# Patient Record
Sex: MALE | NOT HISPANIC OR LATINO | URBAN - METROPOLITAN AREA
[De-identification: names, ages, dates, MRNs, and addresses within clinical notes are randomized per-mention and may not be internally consistent; named-entity substitution may affect disease eponyms.]

---

## 2019-07-26 ENCOUNTER — IMPORTED ENCOUNTER (OUTPATIENT)
Dept: URBAN - METROPOLITAN AREA CLINIC 1 | Facility: CLINIC | Age: 64
End: 2019-07-26

## 2019-07-26 PROBLEM — H25.812: Noted: 2019-07-26

## 2019-07-26 PROBLEM — H01.004: Noted: 2019-07-26

## 2019-07-26 PROBLEM — H01.001: Noted: 2019-07-26

## 2019-07-26 PROBLEM — H25.813: Noted: 2019-07-26

## 2019-07-26 PROBLEM — H25.811: Noted: 2019-07-26

## 2019-07-26 PROCEDURE — 92015 DETERMINE REFRACTIVE STATE: CPT

## 2019-07-26 PROCEDURE — 92004 COMPRE OPH EXAM NEW PT 1/>: CPT

## 2019-07-26 NOTE — PATIENT DISCUSSION
1.  Cataract OD:  Visually Significant secondary to glare discussed the risks benefits alternatives and limitations of cataract surgery. The patient stated a full understanding and a desire to proceed with the procedure. The patient will need to return for preop appointment with cataract measurements and to have any additional questions answered and start pre-operative eye drops as directed. Phaco PCL OS OD. **Pt would only like to schedule OD Phaco per PMG if patient decides to schedule OS on Ascan will make accommodations to do so for patient. (Otherwise follow-up 6 mo 10 glare) 2. Cataract OS: Observe for now without intervention. The patient was advised to contact us if any change or worsening of vision3. Anterior Blepharitis OU - Begin Daily Hot compresses and lid scrubs were recommended. Letter to PCP Return for an appointment with Dr. Sosa Lorenzo for AS/HP. none

## 2019-08-16 ENCOUNTER — IMPORTED ENCOUNTER (OUTPATIENT)
Dept: URBAN - METROPOLITAN AREA CLINIC 1 | Facility: CLINIC | Age: 64
End: 2019-08-16

## 2019-08-16 PROBLEM — H25.813: Noted: 2019-08-16

## 2019-08-16 PROCEDURE — 92136 OPHTHALMIC BIOMETRY: CPT

## 2019-08-16 NOTE — PATIENT DISCUSSION
1. Cataract OU:  Visually Significant discussed the risks benefits alternatives and limitations of cataract surgery. The patient stated a full understanding and a desire to proceed with the procedure. Instructed/ discussed with patient if gtts are expensive (over 150 dollars) to call our office so we can recommend alternatives. Pt understood. Discussed with patient due to irregular astigmatism noted OU would not recommend MF Lens. Discussed option of upgrading to Toric IOL;  however reviewed with patient due to astigmastism irregularity expect to wear glasses for BCVA OU post Phaco. Patient understood. Phaco PCL OS then OD. 3.  Anterior Blepharitis OU - Cont Daily Hot compresses and lid scrubs were recommended.

## 2019-08-28 ENCOUNTER — IMPORTED ENCOUNTER (OUTPATIENT)
Dept: URBAN - METROPOLITAN AREA CLINIC 1 | Facility: CLINIC | Age: 64
End: 2019-08-28

## 2019-08-29 ENCOUNTER — IMPORTED ENCOUNTER (OUTPATIENT)
Dept: URBAN - METROPOLITAN AREA CLINIC 1 | Facility: CLINIC | Age: 64
End: 2019-08-29

## 2019-08-29 PROBLEM — Z96.1: Noted: 2019-08-29

## 2019-08-29 PROCEDURE — 99024 POSTOP FOLLOW-UP VISIT: CPT

## 2019-08-29 NOTE — PATIENT DISCUSSION
POD#1 CE/IOL OS (Toric) doing well. Use Lotemax BID OS Bromsite Qdaily OS Ocuflox TID OS : Use all three gtts through completion of PO gtt chart regimen/ Per our instructions given to patient.   Post op Warnings Reiterated RTC as scheduled

## 2019-09-11 ENCOUNTER — IMPORTED ENCOUNTER (OUTPATIENT)
Dept: URBAN - METROPOLITAN AREA CLINIC 1 | Facility: CLINIC | Age: 64
End: 2019-09-11

## 2019-09-11 PROBLEM — H25.811: Noted: 2019-09-11

## 2019-09-11 PROCEDURE — 66984 XCAPSL CTRC RMVL W/O ECP: CPT

## 2019-09-11 PROCEDURE — 92136 OPHTHALMIC BIOMETRY: CPT

## 2019-09-11 NOTE — PATIENT DISCUSSION
1.  Cataract OD: Visually Significant secondary to glare discussed the risks benefits alternatives and limitations of cataract surgery. The patient stated a full understanding and a desire to proceed with the procedure. Discussed with patient if PO Gtts are more than $120 for all three combined when filling at their Pharmacy please call our office to request generic substitutions. Phaco PCL OD 2. POW#3  CE/IOL OS (Toric) doing well. Use Lotemax BID OS and Bromsite Qdaily OS: Use gtts through completion of PO gtt regimen. F/u as scheduled 2nd eye

## 2019-09-12 ENCOUNTER — IMPORTED ENCOUNTER (OUTPATIENT)
Dept: URBAN - METROPOLITAN AREA CLINIC 1 | Facility: CLINIC | Age: 64
End: 2019-09-12

## 2019-09-12 PROBLEM — Z96.1: Noted: 2019-09-12

## 2019-09-12 PROCEDURE — 99024 POSTOP FOLLOW-UP VISIT: CPT

## 2019-09-12 NOTE — PATIENT DISCUSSION
1. POD#1 Phaco/ PCL OD (Toric) - doing well. Use Lotemax BID OD Bromsite Qdaily OD Ocuflox TID OD : Use all three gtts through completion of PO gtt chart regimen/ Per our instructions given. Post op Warnings Reiterated 2. POW#3 Phaco/ PCL OS (Toric) - doing well  Use Lotemax BID OS and Bromsite Qdaily OS: Use gtts through completion of PO gtt regimen.  RTC as scheduled

## 2019-10-04 ENCOUNTER — IMPORTED ENCOUNTER (OUTPATIENT)
Dept: URBAN - METROPOLITAN AREA CLINIC 1 | Facility: CLINIC | Age: 64
End: 2019-10-04

## 2019-10-04 PROBLEM — Z96.1: Noted: 2019-10-04

## 2019-10-04 PROCEDURE — 99024 POSTOP FOLLOW-UP VISIT: CPT

## 2019-10-04 NOTE — PATIENT DISCUSSION
1. POM#1 CE/IOL OU (Toric OU) doing well. Use Lotemax BID OD and Bromsite Qdaily OD: Use gtts through completion of PO gtt regimen. MRX for glasses given. Return for an appointment in July 30 with Dr. Alida Lilly.

## 2021-02-22 ENCOUNTER — IMPORTED ENCOUNTER (OUTPATIENT)
Dept: URBAN - METROPOLITAN AREA CLINIC 1 | Facility: CLINIC | Age: 66
End: 2021-02-22

## 2021-02-22 PROBLEM — H04.123: Noted: 2021-02-22

## 2021-02-22 PROBLEM — H01.004: Noted: 2021-02-22

## 2021-02-22 PROBLEM — Z96.1: Noted: 2021-02-22

## 2021-02-22 PROBLEM — H01.001: Noted: 2021-02-22

## 2021-02-22 PROCEDURE — 99214 OFFICE O/P EST MOD 30 MIN: CPT

## 2021-02-22 NOTE — PATIENT DISCUSSION
1.  Anterior Blepharitis OU - Daily Hot compresses and lid scrubs were recommended. 2. Dry Eyes OU w/ PEK OS - Recommend ATs TID OU routinely 3. Pseudophakia OU - (Toric OU)  Patient deferred Manifest Rx today. Return for an appointment in 1 year 27 with Dr. Marj Toussaint.

## 2022-04-02 ASSESSMENT — VISUAL ACUITY
OD_CC: J7
OD_CC: 20/250
OD_CC: 20/25
OS_SC: 20/20
OD_CC: 20/250
OS_SC: 20/20
OS_CC: 20/20
OS_CC: J1+
OD_SC: 20/100
OS_GLARE: 20/200
OS_CC: 20/40
OD_CC: 20/20-1
OD_GLARE: 20/400
OS_CC: J1+
OS_CC: 20/15-1
OS_CC: 20/20
OD_CC: 20/800
OD_GLARE: 20/400
OS_GLARE: 20/200
OD_CC: J7
OS_CC: 20/40
OD_CC: 20/20-1
OD_SC: 20/100
OS_CC: 20/25

## 2022-04-02 ASSESSMENT — TONOMETRY
OS_IOP_MMHG: 17
OD_IOP_MMHG: 17
OD_IOP_MMHG: 17
OS_IOP_MMHG: 15
OS_IOP_MMHG: 17
OS_IOP_MMHG: 15
OD_IOP_MMHG: 16
OD_IOP_MMHG: 15
OS_IOP_MMHG: 16